# Patient Record
Sex: FEMALE | Race: WHITE | NOT HISPANIC OR LATINO | URBAN - METROPOLITAN AREA
[De-identification: names, ages, dates, MRNs, and addresses within clinical notes are randomized per-mention and may not be internally consistent; named-entity substitution may affect disease eponyms.]

---

## 2019-03-04 ENCOUNTER — OFFICE (OUTPATIENT)
Dept: URBAN - METROPOLITAN AREA CLINIC 11 | Facility: CLINIC | Age: 24
End: 2019-03-04

## 2019-03-04 VITALS
SYSTOLIC BLOOD PRESSURE: 122 MMHG | WEIGHT: 120 LBS | DIASTOLIC BLOOD PRESSURE: 79 MMHG | HEIGHT: 67 IN | HEART RATE: 81 BPM

## 2019-03-04 DIAGNOSIS — K58.9 IRRITABLE BOWEL SYNDROME WITHOUT DIARRHEA: ICD-10-CM

## 2019-03-04 DIAGNOSIS — R10.84 GENERALIZED ABDOMINAL PAIN: ICD-10-CM

## 2019-03-04 DIAGNOSIS — R19.7 DIARRHEA, UNSPECIFIED: ICD-10-CM

## 2019-03-04 DIAGNOSIS — R11.10 VOMITING, UNSPECIFIED: ICD-10-CM

## 2019-03-04 DIAGNOSIS — R14.0 ABDOMINAL DISTENSION (GASEOUS): ICD-10-CM

## 2019-03-04 LAB
C-REACTIVE PROTEIN, QUANT: 0.2 MG/L (ref 0–4.9)
CBC, PLATELET, NO DIFFERENTIAL: HEMATOCRIT: 44.3 % (ref 34–46.6)
CBC, PLATELET, NO DIFFERENTIAL: HEMOGLOBIN: 15.1 G/DL (ref 11.1–15.9)
CBC, PLATELET, NO DIFFERENTIAL: MCH: 32.5 PG (ref 26.6–33)
CBC, PLATELET, NO DIFFERENTIAL: MCHC: 34.1 G/DL (ref 31.5–35.7)
CBC, PLATELET, NO DIFFERENTIAL: MCV: 95 FL (ref 79–97)
CBC, PLATELET, NO DIFFERENTIAL: PLATELETS: 231 X10E3/UL (ref 150–379)
CBC, PLATELET, NO DIFFERENTIAL: RBC: 4.65 X10E6/UL (ref 3.77–5.28)
CBC, PLATELET, NO DIFFERENTIAL: RDW: 12.9 % (ref 12.3–15.4)
CBC, PLATELET, NO DIFFERENTIAL: WBC: 7.6 X10E3/UL (ref 3.4–10.8)
ENDOMYSIAL ANTIBODY IGA: NEGATIVE
IMMUNOGLOBULIN A, QN, SERUM: 343 MG/DL (ref 87–352)
T-TRANSGLUTAMINASE (TTG) IGA: <2 U/ML
TSH: 1.01 UIU/ML (ref 0.45–4.5)

## 2019-03-04 PROCEDURE — 99204 OFFICE O/P NEW MOD 45 MIN: CPT | Performed by: INTERNAL MEDICINE

## 2019-03-04 RX ORDER — HYOSCYAMINE SULFATE 0.12 MG/1
TABLET ORAL; SUBLINGUAL
Qty: 90 | Refills: 3 | Status: ACTIVE
Start: 2019-03-04

## 2019-03-04 NOTE — SERVICEHPINOTES
Ms. Ayala is a 23-year-old female here for evaluation of longstanding GI issues. She states that she usually will have intermittent crampy abdominal pain associated with diarrhea which has been off and on over the past several years but has become more frequent. She states that she now has to take Imodium otherwise will have diarrhea on a daily basis. The diarrhea is associated with stomach churning and cramping abdominal pain. She will have at least two bowel movements a day if not more depending on what she eats. It is worse whenever she eats rich foods or fried and fatty food. Because of this she has been taking Imodium once a day which has helped some but she still has symptoms. She states that she did see a GI doctor several years ago in [place] who check some blood work and stool studies and told her that she had IBS. No endoscopic evaluation was performed. She also states that she had a negative CT scan. She has tried probiotics and fiber which have not helped. She does not smoke she will occasionally drink alcohol. She does not take any medications or over-the-counter medications. She will occasionally have an episode of a dizzy spell where she will have acute onset dizziness associated with nausea and an episode of vomiting. This usually only last for a few hours and only occurs about once every 2-3 months.

## 2019-03-04 NOTE — SERVICENOTES
The patient's symptoms do appear very consistent with IBS with diarrhea as her symptoms are mainly associated with her change in bowel habits.  With that said I do think we need to rule out other etiologies such as Crohn's disease, celiac disease, etc.  We will initiate workup as above including endoscopic evaluation both for her diarrhea as well as her upper abdominal symptoms.  I will also give her a trial of antispasmodic to see this may help.  If she continues to have diarrhea we may consider trial of Xifaxan, colestipol, or TCA.  We can also consider abdominal imaging.

## 2019-04-10 ENCOUNTER — AMBULATORY SURGICAL CENTER (OUTPATIENT)
Dept: URBAN - METROPOLITAN AREA SURGERY 3 | Facility: SURGERY | Age: 24
End: 2019-04-10

## 2019-04-10 ENCOUNTER — OFFICE (OUTPATIENT)
Dept: URBAN - METROPOLITAN AREA PATHOLOGY 22 | Facility: PATHOLOGY | Age: 24
End: 2019-04-10

## 2019-04-10 VITALS
HEART RATE: 96 BPM | SYSTOLIC BLOOD PRESSURE: 94 MMHG | OXYGEN SATURATION: 98 % | HEIGHT: 67 IN | HEART RATE: 96 BPM | SYSTOLIC BLOOD PRESSURE: 96 MMHG | HEART RATE: 85 BPM | HEIGHT: 67 IN | OXYGEN SATURATION: 98 % | WEIGHT: 120 LBS | SYSTOLIC BLOOD PRESSURE: 102 MMHG | SYSTOLIC BLOOD PRESSURE: 107 MMHG | DIASTOLIC BLOOD PRESSURE: 59 MMHG | OXYGEN SATURATION: 96 % | SYSTOLIC BLOOD PRESSURE: 96 MMHG | TEMPERATURE: 97.7 F | TEMPERATURE: 97.7 F | OXYGEN SATURATION: 100 % | DIASTOLIC BLOOD PRESSURE: 67 MMHG | DIASTOLIC BLOOD PRESSURE: 57 MMHG | RESPIRATION RATE: 16 BRPM | HEART RATE: 80 BPM | RESPIRATION RATE: 18 BRPM | DIASTOLIC BLOOD PRESSURE: 64 MMHG | DIASTOLIC BLOOD PRESSURE: 77 MMHG | DIASTOLIC BLOOD PRESSURE: 71 MMHG | TEMPERATURE: 97.6 F | SYSTOLIC BLOOD PRESSURE: 135 MMHG | HEART RATE: 87 BPM | RESPIRATION RATE: 16 BRPM | OXYGEN SATURATION: 100 % | DIASTOLIC BLOOD PRESSURE: 71 MMHG | HEART RATE: 80 BPM | RESPIRATION RATE: 20 BRPM | RESPIRATION RATE: 17 BRPM | SYSTOLIC BLOOD PRESSURE: 97 MMHG | SYSTOLIC BLOOD PRESSURE: 107 MMHG | OXYGEN SATURATION: 96 % | DIASTOLIC BLOOD PRESSURE: 64 MMHG | DIASTOLIC BLOOD PRESSURE: 59 MMHG | RESPIRATION RATE: 20 BRPM | DIASTOLIC BLOOD PRESSURE: 67 MMHG | HEART RATE: 75 BPM | HEART RATE: 78 BPM | RESPIRATION RATE: 17 BRPM | TEMPERATURE: 97.6 F | SYSTOLIC BLOOD PRESSURE: 97 MMHG | HEART RATE: 75 BPM | SYSTOLIC BLOOD PRESSURE: 135 MMHG | WEIGHT: 120 LBS | HEART RATE: 85 BPM | SYSTOLIC BLOOD PRESSURE: 94 MMHG | DIASTOLIC BLOOD PRESSURE: 77 MMHG | RESPIRATION RATE: 18 BRPM | SYSTOLIC BLOOD PRESSURE: 102 MMHG | HEART RATE: 78 BPM | DIASTOLIC BLOOD PRESSURE: 57 MMHG | HEART RATE: 87 BPM

## 2019-04-10 DIAGNOSIS — R11.2 NAUSEA WITH VOMITING, UNSPECIFIED: ICD-10-CM

## 2019-04-10 DIAGNOSIS — K64.0 FIRST DEGREE HEMORRHOIDS: ICD-10-CM

## 2019-04-10 DIAGNOSIS — R19.7 DIARRHEA, UNSPECIFIED: ICD-10-CM

## 2019-04-10 DIAGNOSIS — R14.0 ABDOMINAL DISTENSION (GASEOUS): ICD-10-CM

## 2019-04-10 PROBLEM — K52.89 CLINICALLY SIGNIFICANT DIARRHEA OF UNEXPLAINED ORIGIN: Status: ACTIVE | Noted: 2019-04-10

## 2019-04-10 PROCEDURE — 43239 EGD BIOPSY SINGLE/MULTIPLE: CPT | Mod: 51 | Performed by: INTERNAL MEDICINE

## 2019-04-10 PROCEDURE — 45380 COLONOSCOPY AND BIOPSY: CPT | Performed by: INTERNAL MEDICINE

## 2019-04-10 PROCEDURE — 88342 IMHCHEM/IMCYTCHM 1ST ANTB: CPT | Performed by: INTERNAL MEDICINE

## 2019-04-10 PROCEDURE — 88305 TISSUE EXAM BY PATHOLOGIST: CPT | Performed by: INTERNAL MEDICINE

## 2019-07-08 ENCOUNTER — OFFICE (OUTPATIENT)
Dept: URBAN - METROPOLITAN AREA CLINIC 11 | Facility: CLINIC | Age: 24
End: 2019-07-08

## 2019-07-08 VITALS
SYSTOLIC BLOOD PRESSURE: 106 MMHG | HEIGHT: 67 IN | WEIGHT: 123 LBS | HEART RATE: 75 BPM | DIASTOLIC BLOOD PRESSURE: 74 MMHG

## 2019-07-08 DIAGNOSIS — K58.0 IRRITABLE BOWEL SYNDROME WITH DIARRHEA: ICD-10-CM

## 2019-07-08 DIAGNOSIS — R14.0 ABDOMINAL DISTENSION (GASEOUS): ICD-10-CM

## 2019-07-08 DIAGNOSIS — R10.84 GENERALIZED ABDOMINAL PAIN: ICD-10-CM

## 2019-07-08 PROCEDURE — 99213 OFFICE O/P EST LOW 20 MIN: CPT | Performed by: INTERNAL MEDICINE

## 2019-07-08 RX ORDER — CETIRIZINE HYDROCHLORIDE 10 MG/1
10 TABLET ORAL
Qty: 30 | Refills: 5 | Status: COMPLETED
Start: 2019-07-08 | End: 2019-10-14

## 2019-07-08 RX ORDER — RANITIDINE 300 MG/1
TABLET ORAL
Qty: 60 | Refills: 5 | Status: COMPLETED
Start: 2019-07-08 | End: 2019-10-14

## 2019-07-08 RX ORDER — HYOSCYAMINE SULFATE 0.12 MG/1
TABLET ORAL; SUBLINGUAL
Qty: 90 | Refills: 3 | Status: ACTIVE
Start: 2019-03-04

## 2019-07-08 NOTE — SERVICENOTES
While the patient likely has IBS with diarrhea.  She did have a modest increase in mast cells on colon biopsies and so we will give her a trial of H1 and H2 blockers.  If this does not work then we can consider trial of colestipol or TCA.  We did discuss the possibility of abdominal imaging such as abdominal ultrasound but the patient states she would like to hold off for now. She has no red flag symptoms.

## 2019-07-08 NOTE — SERVICEHPINOTES
Ms. Ayala is a 24-year-old female here for follow up of longstanding GI issues. She states that she usually will have intermittent crampy abdominal pain associated with diarrhea which has been off and on over the past several years but has become more frequent. She states that she now has to take Imodium initermittently otherwise will have diarrhea on a daily basis. The diarrhea is associated with stomach churning and cramping abdominal pain. She will have at least two bowel movements a day if not more depending on what she eats. It is worse whenever she eats rich foods or fried and fatty food. She states that she did see a GI doctor several years ago in [place] who check some blood work and stool studies and told her that she had IBS. No endoscopic evaluation was performed. She also states that she had a negative CT scan. She has tried probiotics and fiber which have not helped. Interim History:BRBecause of her symptoms we did check basic blood work which was overall normal including CBC, TSH, celiac labs, and inflammatory markers. EGD and colonoscopy were performed in April 2019 with EGD overall unremarkable. Colonoscopy was also normal to the terminal ileum. Biopsies of the duodenum, stomach, and colon were all unremarkable except for random colon biopsies suggesting modest increase in mast cells. There was a small tiny possible nodule in the area of the piriformis sinus on the right and so we referred her to ENT. We did give her a trial of Xifaxan which she states really did not help a whole lot. She now has diarrhea at least once a week and which does have a bowel movement with diarrhea it is 3 times a day. She denies any changes. Her weight is overall stable. She denies any fevers or chills. There is no rectal bleeding.

## 2019-10-14 ENCOUNTER — OFFICE (OUTPATIENT)
Dept: URBAN - METROPOLITAN AREA CLINIC 11 | Facility: CLINIC | Age: 24
End: 2019-10-14

## 2019-10-14 VITALS
HEIGHT: 67 IN | SYSTOLIC BLOOD PRESSURE: 112 MMHG | HEART RATE: 70 BPM | DIASTOLIC BLOOD PRESSURE: 67 MMHG | WEIGHT: 125 LBS

## 2019-10-14 DIAGNOSIS — R10.84 GENERALIZED ABDOMINAL PAIN: ICD-10-CM

## 2019-10-14 DIAGNOSIS — K58.0 IRRITABLE BOWEL SYNDROME WITH DIARRHEA: ICD-10-CM

## 2019-10-14 PROCEDURE — 99213 OFFICE O/P EST LOW 20 MIN: CPT | Performed by: INTERNAL MEDICINE

## 2019-10-14 RX ORDER — RANITIDINE 300 MG/1
TABLET ORAL
Qty: 60 | Refills: 5 | Status: COMPLETED
Start: 2019-07-08 | End: 2019-10-14

## 2019-10-14 RX ORDER — CETIRIZINE HYDROCHLORIDE 10 MG/1
10 TABLET ORAL
Qty: 30 | Refills: 5 | Status: COMPLETED
Start: 2019-07-08 | End: 2019-10-14

## 2019-10-14 NOTE — SERVICENOTES
Her symptoms are most consistent with IBS with diarrhea.  She has had improvement with dietary modification so I recommended she continue this and continue her probiotic.  H1 and H2 blockers did not help that much so I recommended she stop these.  Since she is doing better now she wants to hold off on any medications, however I did recommend that she notify us if she has worsening diarrhea which time we can consider trial of colestipol or TCA.  Since her symptoms are worse in the summer I do wonder if there may be a seasonal allergic component.

## 2019-10-14 NOTE — SERVICEHPINOTES
Ms. Ayala is a 24-year-old female here for follow up of longstanding GI issues. She states that she usually will have intermittent crampy abdominal pain associated with diarrhea which has been off and on over the past several years but has become more frequent. She states that she now has to take Imodium initermittently otherwise will have diarrhea on a daily basis. The diarrhea is associated with stomach churning and cramping abdominal pain. She will have at least two bowel movements a day if not more depending on what she eats. It is worse whenever she eats rich foods or fried and fatty food. She states that she did see a GI doctor several years ago in [place] who check some blood work and stool studies and told her that she had IBS. No endoscopic evaluation was performed. She also states that she had a negative CT scan. She has tried probiotics and fiber which have not helped. Because of her symptoms we did check basic blood work which was overall normal including CBC, TSH, celiac labs, and inflammatory markers. EGD and colonoscopy were performed in April 2019 with EGD overall unremarkable. Colonoscopy was also normal to the terminal ileum. Biopsies of the duodenum, stomach, and colon were all unremarkable except for random colon biopsies suggesting modest increase in mast cells. There was a small tiny possible nodule in the area of the piriformis sinus on the right and so we referred her to ENT. We did give her a trial of Xifaxan which she states really did not help a whole lot. Interim History:Thomas last saw her we did recommend a trial of H1 and H2 blockers which the patient states she took for about a month which did not help a whole lot. She states that she has been working harder on dietary modification and does note significant improvement. She continues her probiotic. She states that as long as she stays away from fatty food, fried food, and rich food her symptoms will be improved. She does note that her symptoms are usually worse over the summer and usually are better in the fall and winter. Her weight is stable. She denies any other symptoms.FONT style="BACKGROUND-COLOR: #[id number]"BR/FONT